# Patient Record
Sex: FEMALE | Race: WHITE | NOT HISPANIC OR LATINO | Employment: OTHER | URBAN - METROPOLITAN AREA
[De-identification: names, ages, dates, MRNs, and addresses within clinical notes are randomized per-mention and may not be internally consistent; named-entity substitution may affect disease eponyms.]

---

## 2022-01-28 ENCOUNTER — OFFICE VISIT (OUTPATIENT)
Dept: ENDOCRINOLOGY | Facility: CLINIC | Age: 77
End: 2022-01-28

## 2022-01-28 VITALS
OXYGEN SATURATION: 94 % | HEART RATE: 83 BPM | HEIGHT: 65 IN | DIASTOLIC BLOOD PRESSURE: 100 MMHG | SYSTOLIC BLOOD PRESSURE: 164 MMHG | BODY MASS INDEX: 25.83 KG/M2 | WEIGHT: 155 LBS

## 2022-01-28 DIAGNOSIS — E04.2 NONTOXIC MULTINODULAR GOITER: Primary | ICD-10-CM

## 2022-01-28 PROCEDURE — 76536 US EXAM OF HEAD AND NECK: CPT | Performed by: INTERNAL MEDICINE

## 2022-01-28 PROCEDURE — 99204 OFFICE O/P NEW MOD 45 MIN: CPT | Performed by: INTERNAL MEDICINE

## 2022-01-28 RX ORDER — NORTRIPTYLINE HYDROCHLORIDE 25 MG/1
25 CAPSULE ORAL NIGHTLY
COMMUNITY
Start: 2021-10-27

## 2022-01-28 RX ORDER — OMEPRAZOLE 20 MG/1
20 CAPSULE, DELAYED RELEASE ORAL DAILY PRN
COMMUNITY

## 2022-01-28 RX ORDER — ASPIRIN 81 MG/1
81 TABLET ORAL DAILY
COMMUNITY

## 2022-01-28 RX ORDER — OFLOXACIN 3 MG/ML
SOLUTION/ DROPS OPHTHALMIC DAILY
COMMUNITY
End: 2022-05-18

## 2022-01-28 RX ORDER — METRONIDAZOLE 7.5 MG/G
1 GEL TOPICAL NIGHTLY
COMMUNITY

## 2022-01-28 RX ORDER — ROSUVASTATIN CALCIUM 20 MG/1
20 TABLET, COATED ORAL DAILY
COMMUNITY

## 2022-01-28 RX ORDER — METOPROLOL SUCCINATE 50 MG/1
50 TABLET, EXTENDED RELEASE ORAL DAILY
COMMUNITY
Start: 2021-10-31

## 2022-01-28 NOTE — ASSESSMENT & PLAN NOTE
To check on her goiter, I did a real time, in office ultrasound today    Results- right lobe is normal in size and texture  Left lobe has a 1.7 cm , irregular nodule with internal calcifications.    Rec: based upon those findings, I will have her back in for an FNA

## 2022-01-28 NOTE — PROGRESS NOTES
"     Office Note      Date: 2022  Patient Name: Crystal Mccloud  MRN: [id number]  : 1945    Chief Complaint   Patient presents with   • Thyroid Problem       History of Present Illness:   Crystal Mccloud is a 76 y.o. female who presents for Thyroid Problem  she is seen as a new patient today  ------------------------------------------  She states she saw me years ago at my other office.I reviewed the old data base and have no record of that which means it was more than 5 years ago.  She has had thyroid nodules for a number of years. They don't bother her except that sometimes she cannot swallow her saliva but she has not trouble swallowing food.  She has not had abnormal thyroid levels and has never taken thyroid medication.    She has not had to have a biopsy      Subjective      [place] was [place]  Facial radiation exposure: No.  High iodine intake: No  Family hx of thyroid disease: No.    Review of Systems:   Review of Systems   Constitutional: Positive for fatigue. Negative for unexpected weight change.   HENT: Positive for trouble swallowing.    Psychiatric/Behavioral: Positive for sleep disturbance.       The following portions of the patient's history were reviewed and updated as appropriate: allergies, current medications, past family history, past medical history, past social history, past surgical history and problem list.    Objective     Visit Vitals  /100   Pulse 83   Ht 165.1 cm (65\")   Wt 70.3 kg (155 lb)   SpO2 94%   BMI 25.79 kg/m²       Labs:    CBC w/DIFF  No results found for: WBC, RBC, HGB, HCT, MCV, MCH, MCHC, RDW, RDWSD, MPV, PLT, NEUTRORELPCT, LYMPHORELPCT, MONORELPCT, EOSRELPCT, BASORELPCT, AUTOIGPER, NEUTROABS, LYMPHSABS, MONOSABS, EOSABS, BASOSABS, AUTOIGNUM, NRBC    T4  No results found for: FREET4    TSH  No results found for: TSHBASE     Physical Exam:  Physical Exam  Vitals reviewed.   Constitutional:       Appearance: Normal appearance.   Eyes:      " Extraocular Movements: Extraocular movements intact.   Neck:      Comments: Right side is larger than left . Soft irregular . No hard nodules   Lymphadenopathy:      Cervical: No cervical adenopathy.   Neurological:      Mental Status: She is alert.   Psychiatric:         Mood and Affect: Mood normal.         Thought Content: Thought content normal.         Judgment: Judgment normal.         Assessment / Plan      Assessment & Plan:  Problem List Items Addressed This Visit        Endocrine and Metabolic    Nontoxic multinodular goiter - Primary    Current Assessment & Plan     To check on her goiter, I did a real time, in office ultrasound today    Results- right lobe is normal in size and texture  Left lobe has a 1.7 cm , irregular nodule with internal calcifications.    Rec: based upon those findings, I will have her back in for an FNA          Relevant Medications    metoprolol succinate XL (TOPROL-XL) 50 MG 24 hr tablet    Other Relevant Orders    US Thyroid (Completed)           Cruz Zhang MD   01/28/2022

## 2022-02-11 ENCOUNTER — OFFICE VISIT (OUTPATIENT)
Dept: ENDOCRINOLOGY | Facility: CLINIC | Age: 77
End: 2022-02-11

## 2022-02-11 VITALS
BODY MASS INDEX: 25.66 KG/M2 | DIASTOLIC BLOOD PRESSURE: 88 MMHG | HEART RATE: 81 BPM | HEIGHT: 65 IN | OXYGEN SATURATION: 95 % | WEIGHT: 154 LBS | SYSTOLIC BLOOD PRESSURE: 178 MMHG

## 2022-02-11 DIAGNOSIS — E04.2 NONTOXIC MULTINODULAR GOITER: Primary | ICD-10-CM

## 2022-02-11 PROCEDURE — 10005 FNA BX W/US GDN 1ST LES: CPT | Performed by: INTERNAL MEDICINE

## 2022-02-11 PROCEDURE — 99212 OFFICE O/P EST SF 10 MIN: CPT | Performed by: INTERNAL MEDICINE

## 2022-02-11 NOTE — PROGRESS NOTES
"     Office Note      Date: 2022  Patient Name: Crystal Mccloud  MRN: [id number]  : 1945    Chief Complaint   Patient presents with   • Goiter       History of Present Illness:   Crystal Mccloud is a 76 y.o. female who presents for Goiter  she is here for me to do an ultrasound guided fna of left thyroid nodule    After consent, using the usual sterile technique and ultrasound guided fna of left thyroid nodule was accomplished without complications. Results to follow     Subjective          Review of Systems:   Review of Systems   Constitutional: Negative.    HENT: Negative.    Eyes: Negative.    Respiratory: Negative.        The following portions of the patient's history were reviewed and updated as appropriate: allergies, current medications, past family history, past medical history, past social history, past surgical history and problem list.    Objective     Visit Vitals  /88 (BP Location: Left arm, Patient Position: Sitting, Cuff Size: Adult)   Pulse 81   Ht 165.1 cm (65\")   Wt 69.9 kg (154 lb)   SpO2 95%   BMI 25.63 kg/m²       Labs:    CBC w/DIFF  No results found for: WBC, RBC, HGB, HCT, MCV, MCH, MCHC, RDW, RDWSD, MPV, PLT, NEUTRORELPCT, LYMPHORELPCT, MONORELPCT, EOSRELPCT, BASORELPCT, AUTOIGPER, NEUTROABS, LYMPHSABS, MONOSABS, EOSABS, BASOSABS, AUTOIGNUM, NRBC    T4  No results found for: FREET4    TSH  No results found for: TSHBASE     Physical Exam:  Physical Exam  Vitals reviewed.   Constitutional:       Appearance: Normal appearance.   Neurological:      Mental Status: She is alert.         Assessment / Plan      Assessment & Plan:  Problem List Items Addressed This Visit        Other    Nontoxic multinodular goiter - Primary    Current Assessment & Plan     fna done without complication         Relevant Medications    metoprolol succinate XL (TOPROL-XL) 50 MG 24 hr tablet    Other Relevant Orders    US Thyroid (Completed)           Cruz Zhang MD   2022  "

## 2022-02-15 DIAGNOSIS — E04.2 NONTOXIC MULTINODULAR GOITER: Primary | ICD-10-CM

## 2022-05-18 ENCOUNTER — PRE-ADMISSION TESTING (OUTPATIENT)
Dept: PREADMISSION TESTING | Facility: HOSPITAL | Age: 77
End: 2022-05-18

## 2022-05-18 VITALS — WEIGHT: 155.65 LBS | BODY MASS INDEX: 25.93 KG/M2 | HEIGHT: 65 IN

## 2022-05-18 LAB
ANION GAP SERPL CALCULATED.3IONS-SCNC: 10 MMOL/L (ref 5–15)
BUN SERPL-MCNC: 16 MG/DL (ref 8–23)
BUN/CREAT SERPL: 17.8 (ref 7–25)
CALCIUM SPEC-SCNC: 9.8 MG/DL (ref 8.6–10.5)
CHLORIDE SERPL-SCNC: 104 MMOL/L (ref 98–107)
CO2 SERPL-SCNC: 29 MMOL/L (ref 22–29)
CREAT SERPL-MCNC: 0.9 MG/DL (ref 0.57–1)
DEPRECATED RDW RBC AUTO: 38.5 FL (ref 37–54)
EGFRCR SERPLBLD CKD-EPI 2021: 66.4 ML/MIN/1.73
ERYTHROCYTE [DISTWIDTH] IN BLOOD BY AUTOMATED COUNT: 12 % (ref 12.3–15.4)
GLUCOSE SERPL-MCNC: 118 MG/DL (ref 65–99)
HCT VFR BLD AUTO: 42.5 % (ref 34–46.6)
HGB BLD-MCNC: 13.7 G/DL (ref 12–15.9)
MCH RBC QN AUTO: 28.2 PG (ref 26.6–33)
MCHC RBC AUTO-ENTMCNC: 32.2 G/DL (ref 31.5–35.7)
MCV RBC AUTO: 87.4 FL (ref 79–97)
PLATELET # BLD AUTO: 226 10*3/MM3 (ref 140–450)
PMV BLD AUTO: 9.3 FL (ref 6–12)
POTASSIUM SERPL-SCNC: 4.1 MMOL/L (ref 3.5–5.2)
QT INTERVAL: 414 MS
QTC INTERVAL: 447 MS
RBC # BLD AUTO: 4.86 10*6/MM3 (ref 3.77–5.28)
SARS-COV-2 RNA PNL SPEC NAA+PROBE: NOT DETECTED
SODIUM SERPL-SCNC: 143 MMOL/L (ref 136–145)
WBC NRBC COR # BLD: 6.49 10*3/MM3 (ref 3.4–10.8)

## 2022-05-18 PROCEDURE — 93005 ELECTROCARDIOGRAM TRACING: CPT

## 2022-05-18 PROCEDURE — U0005 INFEC AGEN DETEC AMPLI PROBE: HCPCS

## 2022-05-18 PROCEDURE — 80048 BASIC METABOLIC PNL TOTAL CA: CPT

## 2022-05-18 PROCEDURE — 36415 COLL VENOUS BLD VENIPUNCTURE: CPT

## 2022-05-18 PROCEDURE — 85027 COMPLETE CBC AUTOMATED: CPT

## 2022-05-18 PROCEDURE — C9803 HOPD COVID-19 SPEC COLLECT: HCPCS

## 2022-05-18 PROCEDURE — 93010 ELECTROCARDIOGRAM REPORT: CPT | Performed by: INTERNAL MEDICINE

## 2022-05-18 PROCEDURE — U0004 COV-19 TEST NON-CDC HGH THRU: HCPCS

## 2022-05-18 RX ORDER — PREDNISOLONE ACETATE 10 MG/ML
1 SUSPENSION/ DROPS OPHTHALMIC DAILY
COMMUNITY

## 2022-05-18 RX ORDER — AMLODIPINE BESYLATE 5 MG/1
5 TABLET ORAL DAILY
COMMUNITY

## 2022-05-18 NOTE — PAT
Patient to apply Chlorhexadine wipes  to surgical area (as instructed) the night before procedure and the AM of procedure. Wipes provided.    Patient viewed general PAT education video as instructed in their preoperative information received from their surgeon.  Patient stated the general PAT education video was viewed in its entirety and survey completed.  Copies of PAT general education handouts (Incentive Spirometry, Meds to Beds Program, Patient Belongings, Pre-op skin preparation instructions, Blood Glucose testing, Visitor policy, Surgery FAQ, Code H) distributed to patient if not printed. Education related to the PAT pass and skin preparation for surgery (if applicable) completed in PAT as a reinforcement to PAT education video. Patient instructed to return PAT pass provided today as well as completed skin preparation sheet (if applicable) on the day of procedure.     Additionally if patient had not viewed video yet but intended to view it at home or in our waiting area, then referred them to the handout with QR code/link provided during PAT visit.  Instructed patient to complete survey after viewing the video in its entirety.  Encouraged patient/family to read PAT general education handouts thoroughly and notify PAT staff with any questions or concerns. Patient verbalized understanding of all information and priority content.    Cardiac clearance per dr ro on chart

## 2022-05-19 ENCOUNTER — ANESTHESIA EVENT (OUTPATIENT)
Dept: PERIOP | Facility: HOSPITAL | Age: 77
End: 2022-05-19

## 2022-05-19 RX ORDER — FAMOTIDINE 10 MG/ML
20 INJECTION, SOLUTION INTRAVENOUS ONCE
Status: CANCELLED | OUTPATIENT
Start: 2022-05-19 | End: 2022-05-19

## 2022-05-19 RX ORDER — SODIUM CHLORIDE 0.9 % (FLUSH) 0.9 %
10 SYRINGE (ML) INJECTION EVERY 12 HOURS SCHEDULED
Status: CANCELLED | OUTPATIENT
Start: 2022-05-19

## 2022-05-19 RX ORDER — SODIUM CHLORIDE 0.9 % (FLUSH) 0.9 %
10 SYRINGE (ML) INJECTION AS NEEDED
Status: CANCELLED | OUTPATIENT
Start: 2022-05-19

## 2022-05-20 ENCOUNTER — HOSPITAL ENCOUNTER (OUTPATIENT)
Facility: HOSPITAL | Age: 77
Discharge: HOME | End: 2022-05-21
Attending: SURGERY | Admitting: SURGERY

## 2022-05-20 ENCOUNTER — ANESTHESIA (OUTPATIENT)
Dept: PERIOP | Facility: HOSPITAL | Age: 77
End: 2022-05-20

## 2022-05-20 DIAGNOSIS — E04.1 THYROID NODULE: ICD-10-CM

## 2022-05-20 PROCEDURE — 25010000002 PROPOFOL 10 MG/ML EMULSION: Performed by: NURSE ANESTHETIST, CERTIFIED REGISTERED

## 2022-05-20 PROCEDURE — 63710000001 NORTRIPTYLINE 25 MG CAPSULE: Performed by: SURGERY

## 2022-05-20 PROCEDURE — G0378 HOSPITAL OBSERVATION PER HR: HCPCS

## 2022-05-20 PROCEDURE — 25010000002 DEXAMETHASONE PER 1 MG: Performed by: NURSE ANESTHETIST, CERTIFIED REGISTERED

## 2022-05-20 PROCEDURE — 60220 PARTIAL REMOVAL OF THYROID: CPT

## 2022-05-20 PROCEDURE — 25010000002 ONDANSETRON PER 1 MG

## 2022-05-20 PROCEDURE — 25010000002 HYDROMORPHONE 1 MG/ML SOLUTION

## 2022-05-20 PROCEDURE — 25010000002 FENTANYL CITRATE (PF) 50 MCG/ML SOLUTION

## 2022-05-20 PROCEDURE — 88307 TISSUE EXAM BY PATHOLOGIST: CPT | Performed by: SURGERY

## 2022-05-20 PROCEDURE — 25010000002 SUCCINYLCHOLINE PER 20 MG: Performed by: NURSE ANESTHETIST, CERTIFIED REGISTERED

## 2022-05-20 PROCEDURE — 63710000001 FAMOTIDINE 20 MG TABLET: Performed by: SURGERY

## 2022-05-20 PROCEDURE — 25010000002 FENTANYL CITRATE (PF) 50 MCG/ML SOLUTION: Performed by: NURSE ANESTHETIST, CERTIFIED REGISTERED

## 2022-05-20 PROCEDURE — A9270 NON-COVERED ITEM OR SERVICE: HCPCS | Performed by: SURGERY

## 2022-05-20 PROCEDURE — 63710000001 OXYCODONE-ACETAMINOPHEN 5-325 MG TABLET: Performed by: SURGERY

## 2022-05-20 PROCEDURE — 25010000002 ONDANSETRON PER 1 MG: Performed by: NURSE ANESTHETIST, CERTIFIED REGISTERED

## 2022-05-20 PROCEDURE — 0 LIDOCAINE 1 % SOLUTION: Performed by: NURSE ANESTHETIST, CERTIFIED REGISTERED

## 2022-05-20 DEVICE — ABSORBABLE HEMOSTAT (OXIDIZED REGENERATED CELLULOSE, U.S.P.)
Type: IMPLANTABLE DEVICE | Site: THYROID | Status: FUNCTIONAL
Brand: SURGICEL

## 2022-05-20 DEVICE — HORIZON TI MED 6/CART
Type: IMPLANTABLE DEVICE | Site: THYROID | Status: FUNCTIONAL
Brand: WECK

## 2022-05-20 DEVICE — HORIZON TI SMALL 6 CLIPS/CART
Type: IMPLANTABLE DEVICE | Site: THYROID | Status: FUNCTIONAL
Brand: WECK

## 2022-05-20 RX ORDER — FAMOTIDINE 20 MG/1
20 TABLET, FILM COATED ORAL ONCE
Status: COMPLETED | OUTPATIENT
Start: 2022-05-20 | End: 2022-05-20

## 2022-05-20 RX ORDER — ONDANSETRON 2 MG/ML
INJECTION INTRAMUSCULAR; INTRAVENOUS
Status: COMPLETED
Start: 2022-05-20 | End: 2022-05-20

## 2022-05-20 RX ORDER — SODIUM CHLORIDE, SODIUM LACTATE, POTASSIUM CHLORIDE, CALCIUM CHLORIDE 600; 310; 30; 20 MG/100ML; MG/100ML; MG/100ML; MG/100ML
9 INJECTION, SOLUTION INTRAVENOUS CONTINUOUS
Status: DISCONTINUED | OUTPATIENT
Start: 2022-05-20 | End: 2022-05-20

## 2022-05-20 RX ORDER — NORTRIPTYLINE HYDROCHLORIDE 25 MG/1
25 CAPSULE ORAL NIGHTLY
Status: DISCONTINUED | OUTPATIENT
Start: 2022-05-20 | End: 2022-05-21 | Stop reason: HOSPADM

## 2022-05-20 RX ORDER — NALOXONE HCL 0.4 MG/ML
0.1 VIAL (ML) INJECTION
Status: DISCONTINUED | OUTPATIENT
Start: 2022-05-20 | End: 2022-05-21 | Stop reason: HOSPADM

## 2022-05-20 RX ORDER — ONDANSETRON 4 MG/1
4 TABLET, FILM COATED ORAL EVERY 6 HOURS PRN
Status: DISCONTINUED | OUTPATIENT
Start: 2022-05-20 | End: 2022-05-21 | Stop reason: HOSPADM

## 2022-05-20 RX ORDER — AMLODIPINE BESYLATE 5 MG/1
5 TABLET ORAL DAILY
Status: DISCONTINUED | OUTPATIENT
Start: 2022-05-21 | End: 2022-05-21 | Stop reason: HOSPADM

## 2022-05-20 RX ORDER — DOCUSATE SODIUM 100 MG/1
100 CAPSULE, LIQUID FILLED ORAL 2 TIMES DAILY PRN
Status: DISCONTINUED | OUTPATIENT
Start: 2022-05-20 | End: 2022-05-21 | Stop reason: HOSPADM

## 2022-05-20 RX ORDER — ONDANSETRON 2 MG/ML
4 INJECTION INTRAMUSCULAR; INTRAVENOUS EVERY 6 HOURS PRN
Status: DISCONTINUED | OUTPATIENT
Start: 2022-05-20 | End: 2022-05-21 | Stop reason: HOSPADM

## 2022-05-20 RX ORDER — CEFAZOLIN SODIUM IN 0.9 % NACL 2 G/100 ML
2 PLASTIC BAG, INJECTION (ML) INTRAVENOUS ONCE
Status: COMPLETED | OUTPATIENT
Start: 2022-05-20 | End: 2022-05-20

## 2022-05-20 RX ORDER — LIDOCAINE HYDROCHLORIDE 10 MG/ML
INJECTION, SOLUTION INFILTRATION; PERINEURAL AS NEEDED
Status: DISCONTINUED | OUTPATIENT
Start: 2022-05-20 | End: 2022-05-20 | Stop reason: SURG

## 2022-05-20 RX ORDER — DEXTROSE AND SODIUM CHLORIDE 5; .45 G/100ML; G/100ML
75 INJECTION, SOLUTION INTRAVENOUS CONTINUOUS
Status: DISCONTINUED | OUTPATIENT
Start: 2022-05-20 | End: 2022-05-20

## 2022-05-20 RX ORDER — PROPOFOL 10 MG/ML
VIAL (ML) INTRAVENOUS AS NEEDED
Status: DISCONTINUED | OUTPATIENT
Start: 2022-05-20 | End: 2022-05-20 | Stop reason: SURG

## 2022-05-20 RX ORDER — ROCURONIUM BROMIDE 10 MG/ML
INJECTION, SOLUTION INTRAVENOUS AS NEEDED
Status: DISCONTINUED | OUTPATIENT
Start: 2022-05-20 | End: 2022-05-20 | Stop reason: SURG

## 2022-05-20 RX ORDER — OXYCODONE HYDROCHLORIDE AND ACETAMINOPHEN 5; 325 MG/1; MG/1
1 TABLET ORAL EVERY 4 HOURS PRN
Status: DISCONTINUED | OUTPATIENT
Start: 2022-05-20 | End: 2022-05-21 | Stop reason: HOSPADM

## 2022-05-20 RX ORDER — DEXAMETHASONE SODIUM PHOSPHATE 4 MG/ML
INJECTION, SOLUTION INTRA-ARTICULAR; INTRALESIONAL; INTRAMUSCULAR; INTRAVENOUS; SOFT TISSUE AS NEEDED
Status: DISCONTINUED | OUTPATIENT
Start: 2022-05-20 | End: 2022-05-20 | Stop reason: SURG

## 2022-05-20 RX ORDER — FAMOTIDINE 20 MG/1
20 TABLET, FILM COATED ORAL 2 TIMES DAILY
Status: DISCONTINUED | OUTPATIENT
Start: 2022-05-20 | End: 2022-05-21 | Stop reason: HOSPADM

## 2022-05-20 RX ORDER — HYDROMORPHONE HYDROCHLORIDE 1 MG/ML
0.5 INJECTION, SOLUTION INTRAMUSCULAR; INTRAVENOUS; SUBCUTANEOUS
Status: DISCONTINUED | OUTPATIENT
Start: 2022-05-20 | End: 2022-05-20 | Stop reason: HOSPADM

## 2022-05-20 RX ORDER — MIDAZOLAM HYDROCHLORIDE 1 MG/ML
0.5 INJECTION INTRAMUSCULAR; INTRAVENOUS
Status: DISCONTINUED | OUTPATIENT
Start: 2022-05-20 | End: 2022-05-20 | Stop reason: HOSPADM

## 2022-05-20 RX ORDER — ONDANSETRON 2 MG/ML
4 INJECTION INTRAMUSCULAR; INTRAVENOUS ONCE AS NEEDED
Status: COMPLETED | OUTPATIENT
Start: 2022-05-20 | End: 2022-05-20

## 2022-05-20 RX ORDER — ASPIRIN 81 MG/1
81 TABLET ORAL DAILY
Status: DISCONTINUED | OUTPATIENT
Start: 2022-05-21 | End: 2022-05-21 | Stop reason: HOSPADM

## 2022-05-20 RX ORDER — SUCCINYLCHOLINE CHLORIDE 20 MG/ML
INJECTION INTRAMUSCULAR; INTRAVENOUS AS NEEDED
Status: DISCONTINUED | OUTPATIENT
Start: 2022-05-20 | End: 2022-05-20 | Stop reason: SURG

## 2022-05-20 RX ORDER — MAGNESIUM HYDROXIDE 1200 MG/15ML
LIQUID ORAL AS NEEDED
Status: DISCONTINUED | OUTPATIENT
Start: 2022-05-20 | End: 2022-05-20 | Stop reason: HOSPADM

## 2022-05-20 RX ORDER — FENTANYL CITRATE 50 UG/ML
INJECTION, SOLUTION INTRAMUSCULAR; INTRAVENOUS
Status: COMPLETED
Start: 2022-05-20 | End: 2022-05-20

## 2022-05-20 RX ORDER — LIDOCAINE HYDROCHLORIDE 10 MG/ML
0.5 INJECTION, SOLUTION EPIDURAL; INFILTRATION; INTRACAUDAL; PERINEURAL ONCE AS NEEDED
Status: COMPLETED | OUTPATIENT
Start: 2022-05-20 | End: 2022-05-20

## 2022-05-20 RX ORDER — LIDOCAINE HYDROCHLORIDE 20 MG/ML
JELLY TOPICAL AS NEEDED
Status: DISCONTINUED | OUTPATIENT
Start: 2022-05-20 | End: 2022-05-20 | Stop reason: SURG

## 2022-05-20 RX ORDER — ONDANSETRON 2 MG/ML
INJECTION INTRAMUSCULAR; INTRAVENOUS AS NEEDED
Status: DISCONTINUED | OUTPATIENT
Start: 2022-05-20 | End: 2022-05-20 | Stop reason: SURG

## 2022-05-20 RX ORDER — FENTANYL CITRATE 50 UG/ML
INJECTION, SOLUTION INTRAMUSCULAR; INTRAVENOUS AS NEEDED
Status: DISCONTINUED | OUTPATIENT
Start: 2022-05-20 | End: 2022-05-20 | Stop reason: SURG

## 2022-05-20 RX ORDER — FENTANYL CITRATE 50 UG/ML
50 INJECTION, SOLUTION INTRAMUSCULAR; INTRAVENOUS
Status: DISCONTINUED | OUTPATIENT
Start: 2022-05-20 | End: 2022-05-20 | Stop reason: HOSPADM

## 2022-05-20 RX ORDER — METOPROLOL SUCCINATE 50 MG/1
50 TABLET, EXTENDED RELEASE ORAL DAILY
Status: DISCONTINUED | OUTPATIENT
Start: 2022-05-21 | End: 2022-05-21 | Stop reason: HOSPADM

## 2022-05-20 RX ORDER — LEVOTHYROXINE SODIUM 0.05 MG/1
50 TABLET ORAL
Status: DISCONTINUED | OUTPATIENT
Start: 2022-05-21 | End: 2022-05-21 | Stop reason: HOSPADM

## 2022-05-20 RX ADMIN — FENTANYL CITRATE 100 MCG: 50 INJECTION, SOLUTION INTRAMUSCULAR; INTRAVENOUS at 09:40

## 2022-05-20 RX ADMIN — PROPOFOL 35 MCG/KG/MIN: 10 INJECTION, EMULSION INTRAVENOUS at 09:44

## 2022-05-20 RX ADMIN — SUCCINYLCHOLINE CHLORIDE 125 MG: 20 INJECTION, SOLUTION INTRAMUSCULAR; INTRAVENOUS at 09:40

## 2022-05-20 RX ADMIN — LIDOCAINE HYDROCHLORIDE 50 MG: 10 INJECTION, SOLUTION INFILTRATION; PERINEURAL at 09:40

## 2022-05-20 RX ADMIN — HYDROMORPHONE HYDROCHLORIDE 0.5 MG: 1 INJECTION, SOLUTION INTRAMUSCULAR; INTRAVENOUS; SUBCUTANEOUS at 12:18

## 2022-05-20 RX ADMIN — PROPOFOL 150 MG: 10 INJECTION, EMULSION INTRAVENOUS at 09:40

## 2022-05-20 RX ADMIN — LIDOCAINE HYDROCHLORIDE 0.5 ML: 10 INJECTION, SOLUTION EPIDURAL; INFILTRATION; INTRACAUDAL; PERINEURAL at 07:55

## 2022-05-20 RX ADMIN — SODIUM CHLORIDE, POTASSIUM CHLORIDE, SODIUM LACTATE AND CALCIUM CHLORIDE 9 ML/HR: 600; 310; 30; 20 INJECTION, SOLUTION INTRAVENOUS at 07:55

## 2022-05-20 RX ADMIN — OXYCODONE HYDROCHLORIDE AND ACETAMINOPHEN 1 TABLET: 5; 325 TABLET ORAL at 16:25

## 2022-05-20 RX ADMIN — DEXAMETHASONE SODIUM PHOSPHATE 8 MG: 4 INJECTION, SOLUTION INTRA-ARTICULAR; INTRALESIONAL; INTRAMUSCULAR; INTRAVENOUS; SOFT TISSUE at 09:44

## 2022-05-20 RX ADMIN — NORTRIPTYLINE HYDROCHLORIDE 25 MG: 25 CAPSULE ORAL at 20:54

## 2022-05-20 RX ADMIN — FAMOTIDINE 20 MG: 20 TABLET ORAL at 08:13

## 2022-05-20 RX ADMIN — ONDANSETRON 4 MG: 2 INJECTION INTRAMUSCULAR; INTRAVENOUS at 11:10

## 2022-05-20 RX ADMIN — HYDROMORPHONE HYDROCHLORIDE 0.5 MG: 1 INJECTION, SOLUTION INTRAMUSCULAR; INTRAVENOUS; SUBCUTANEOUS at 11:10

## 2022-05-20 RX ADMIN — ONDANSETRON 4 MG: 2 INJECTION INTRAMUSCULAR; INTRAVENOUS at 10:33

## 2022-05-20 RX ADMIN — ROCURONIUM BROMIDE 5 MG: 10 INJECTION, SOLUTION INTRAVENOUS at 09:40

## 2022-05-20 RX ADMIN — FAMOTIDINE 20 MG: 20 TABLET ORAL at 20:54

## 2022-05-20 RX ADMIN — LIDOCAINE HYDROCHLORIDE 3 ML: 20 JELLY TOPICAL at 09:41

## 2022-05-20 RX ADMIN — OXYCODONE HYDROCHLORIDE AND ACETAMINOPHEN 1 TABLET: 5; 325 TABLET ORAL at 20:59

## 2022-05-20 RX ADMIN — FENTANYL CITRATE 50 MCG: 50 INJECTION, SOLUTION INTRAMUSCULAR; INTRAVENOUS at 11:20

## 2022-05-20 RX ADMIN — CEFAZOLIN 2 G: 10 INJECTION, POWDER, FOR SOLUTION INTRAVENOUS at 09:34

## 2022-05-20 RX ADMIN — FENTANYL CITRATE 50 MCG: 50 INJECTION, SOLUTION INTRAMUSCULAR; INTRAVENOUS at 11:30

## 2022-05-20 RX ADMIN — SODIUM CHLORIDE, POTASSIUM CHLORIDE, SODIUM LACTATE AND CALCIUM CHLORIDE 9 ML/HR: 600; 310; 30; 20 INJECTION, SOLUTION INTRAVENOUS at 11:02

## 2022-05-20 NOTE — ANESTHESIA PREPROCEDURE EVALUATION
Anesthesia Evaluation     Patient summary reviewed and Nursing notes reviewed   no history of anesthetic complications:  NPO Solid Status: > 8 hours  NPO Liquid Status: > 2 hours           Airway   Mallampati: II  TM distance: >3 FB  Neck ROM: full  No difficulty expected  Dental - normal exam     Pulmonary - negative pulmonary ROS and normal exam    breath sounds clear to auscultation  Cardiovascular - normal exam    ECG reviewed  Rhythm: regular  Rate: normal    (+) hypertension, valvular problems/murmurs (s/p AVR 2013 - no issues since replacement),       Neuro/Psych  (+) TIA,    GI/Hepatic/Renal/Endo    (+)  GERD (Occasional),  thyroid problem (Goiter - no dysphagia or positional dyspnea)     Musculoskeletal (-) negative ROS    Abdominal    Substance History      OB/GYN          Other - negative ROS                       Anesthesia Plan    ASA 2     general     intravenous induction     Anesthetic plan, all risks, benefits, and alternatives have been provided, discussed and informed consent has been obtained with: patient.    Plan discussed with CRNA.        CODE STATUS:

## 2022-05-20 NOTE — PROGRESS NOTES
"Patient Name:  Crystal Mccloud  YOB: 1945  [id number]    Surgery Progress Note    Date of visit: 5/20/2022    Subjective   Pain well controlled. No nausea/vomiting. No fevers/chills.          Objective       /77 (BP Location: Right arm, Patient Position: Lying)   Pulse 74   Temp 96.8 °F (36 °C) (Axillary)   Resp 16   Ht 165.1 cm (65\")   Wt 70.3 kg (155 lb)   SpO2 90%   BMI 25.79 kg/m²     Intake/Output Summary (Last 24 hours) at 5/20/2022 1429  Last data filed at 5/20/2022 1050  Gross per 24 hour   Intake 800 ml   Output --   Net 800 ml       CV:  Rhythm regular and rate regular  L:  Clear to auscultation bilaterally  Abd:  Bowel sounds positive, soft  Ext:  No cyanosis, clubbing, edema  HEENT: neck flat, dressing c/d/i           Assessment & Plan     Pt POD#0 left thyroid lobectomy  Pain control  OOB, IS, DVT prlx  Advance diet as tolerated, HLIV  Start Synthroid in AM        Je Gutierrez MD  5/20/2022  14:29 EDT      "

## 2022-05-20 NOTE — CASE MANAGEMENT/SOCIAL WORK
Discharge Planning Assessment  [facility]     Patient Name: Crystal Mccloud  MRN: [id number]  Today's Date: 5/20/2022    Admit Date: 5/20/2022     Discharge Needs Assessment     Row Name 05/20/22 1404       Living Environment    People in Home alone    Current Living Arrangements home    Primary Care Provided by self    Provides Primary Care For no one    Family Caregiver if Needed child(anand), adult    Able to Return to Prior Arrangements yes       Resource/Environmental Concerns    Resource/Environmental Concerns none       Transition Planning    Patient/Family Anticipates Transition to home    Patient/Family Anticipated Services at Transition none    Transportation Anticipated family or friend will provide       Discharge Needs Assessment    Readmission Within the Last 30 Days no previous admission in last 30 days    Equipment Currently Used at Home shower chair    Concerns to be Addressed no discharge needs identified;denies needs/concerns at this time    Anticipated Changes Related to Illness none    Equipment Needed After Discharge none               Discharge Plan     Row Name 05/20/22 1409       Plan    Plan Home    Patient/Family in Agreement with Plan yes    Plan Comments Met & spoke with Ms Mccloud at the bedside. She lives alone in [facility]. At baseline, is ind with ADL's/mobility. Denies HH/Rehab/O2. Has a shower chair. Has a living will, but it doesn't indicate a HCS. No POA. No needs voiced. Plan is home and her family will transport.    Final Discharge Disposition Code 01 - home or self-care              Continued Care and Services - Admitted Since 5/20/2022    Coordination has not been started for this encounter.          Demographic Summary     Row Name 05/20/22 1408       General Information    General Information Comments Primary insurance is Medicare. Secondary is [employer] [place]. Has drug coverage.               Functional Status     Row Name 05/20/22 8916       Functional Status     Usual Activity Tolerance good    Current Activity Tolerance good       Functional Status, IADL    Medications independent    Meal Preparation independent    Housekeeping independent    Laundry independent    Shopping independent               Psychosocial    No documentation.                Abuse/Neglect    No documentation.                Legal    No documentation.                Substance Abuse    No documentation.                Patient Forms    No documentation.                   Ann Maradiaga RN

## 2022-05-20 NOTE — H&P
Pre-Op H&P  Crystal Mccloud  [id number]  1945      Chief complaint: Thyroid nodule      Subjective:  Patient is a 76 y.o.female presents for scheduled surgery by Dr. Gutierrez. She anticipates a LEFT THYROID LOBECTOMY today.  She had FNA left thyroid nodule by endocrinology on 2/11/2022 showing atypical results.  She reports some difficulty swallowing, occasional choking and regurgitation.  She denies changes in weight or activity level.      Review of Systems:  Constitutional-- No fever, chills or sweats. No fatigue.  CV-- No chest pain, palpitation or syncope. +HTN, HLD, CAD  Resp-- No SOB, cough, hemoptysis. +BI   Skin--No rashes or lesions      Allergies: No Known Allergies      Home Meds:  Medications Prior to Admission   Medication Sig Dispense Refill Last Dose   • amLODIPine (NORVASC) 5 MG tablet Take 5 mg by mouth Daily.   5/20/2022 at 0530   • aspirin 81 MG EC tablet Take 81 mg by mouth Daily.   5/19/2022 at 0830   • hydrocortisone 2.5 % cream Apply 1 application topically to the appropriate area as directed Daily. Daily to face for roscea   5/19/2022 at 2000   • metoprolol succinate XL (TOPROL-XL) 50 MG 24 hr tablet Take 50 mg by mouth Daily.   5/20/2022 at 0530   • nortriptyline (PAMELOR) 25 MG capsule Take 25 mg by mouth Every Night.   5/19/2022 at 2100   • omeprazole (priLOSEC) 20 MG capsule Take 20 mg by mouth Daily As Needed (reflux, heartburn).   5/19/2022 at 2200   • prednisoLONE acetate (PRED FORTE) 1 % ophthalmic suspension Administer 1 drop to both eyes Daily.   5/19/2022 at 2000   • rosuvastatin (CRESTOR) 20 MG tablet Take 20 mg by mouth Daily.   5/19/2022 at 0830   • metroNIDAZOLE (METROGEL) 0.75 % gel Apply 1 application topically to the appropriate area as directed Every Night. Apply to nose   5/18/2022 at 2000         PMH:   Past Medical History:   Diagnosis Date   • Anesthesia     slow to be extubated with heart surgery, no problems with cholecystectomy (most recent surgery)   •  "Coronary artery disease    • GERD (gastroesophageal reflux disease)    • Goiter    • Hyperlipidemia    • Hypertension    • Sleep apnea    • Stroke (HCC)     TIA, multiple. Pt states due to use of Eliquis   • Thyroid condition     Thyroid Nodule     PSH:    Past Surgical History:   Procedure Laterality Date   • CARDIAC CATHETERIZATION     • CATARACT EXTRACTION Bilateral    • COLONOSCOPY     • EYE SURGERY Bilateral     cornea transplant   • GALLBLADDER SURGERY     • HYSTERECTOMY     • MASS EXCISION Right     breast   • TISSUE AORTIC VALVE REPLACEMENT  2013    bovine with bypass x1per dr AVERY       Immunization History:  Influenza: No  Pneumococcal: No  Tetanus: No  Covid : No    Social History:   Tobacco:   Social History     Tobacco Use   Smoking Status Never Smoker   Smokeless Tobacco Never Used      Alcohol:     Social History     Substance and Sexual Activity   Alcohol Use Never         Physical Exam:/81 (BP Location: Right arm, Patient Position: Lying)   Pulse 75   Temp 96.3 °F (35.7 °C) (Temporal)   Resp 16   Ht 165.1 cm (65\")   Wt 70.3 kg (155 lb)   SpO2 100%   BMI 25.79 kg/m²       General Appearance:    Alert, cooperative, no distress, appears stated age   Head:    Normocephalic, without obvious abnormality, atraumatic   Lungs:     Clear to auscultation bilaterally, respirations unlabored    Heart:   Regular rate and rhythm, S1 and S2 normal    Abdomen:    Soft without tenderness   Extremities:   Extremities normal, atraumatic, no cyanosis or edema   Skin:   Skin color, texture, turgor normal, no rashes or lesions   Neurologic:   Grossly intact     Results Review:     LABS:  Lab Results   Component Value Date    WBC 6.49 05/18/2022    HGB 13.7 05/18/2022    HCT 42.5 05/18/2022    MCV 87.4 05/18/2022     05/18/2022    GLUCOSE 118 (H) 05/18/2022    BUN 16 05/18/2022    CREATININE 0.90 05/18/2022     05/18/2022    K 4.1 05/18/2022     05/18/2022    CO2 29.0 05/18/2022    CALCIUM " 9.8 05/18/2022       RADIOLOGY:  Imaging Results (Last 72 Hours)     ** No results found for the last 72 hours. **          I reviewed the patient's new clinical results.    Cancer Staging (if applicable)  Cancer Patient: __ yes __no __unknown; If yes, clinical stage T:__ N:__M:__, stage group or __N/A      Impression: single non-toxic thyroid nodule       Plan: LEFT THYROID LOBECTOMY      Julia Durham, CLEMENCIA   5/20/2022   08:23 EDT

## 2022-05-20 NOTE — BRIEF OP NOTE
THYROIDECTOMY  Progress Note    Crystal Mccloud  5/20/2022    Pre-op Diagnosis:   Left thyroid nodule       Post-Op Diagnosis Codes:     * Left thyroid nodule [E04.1]    Procedure/CPT® Codes:        Procedure(s):  LEFT THYROID LOBECTOMY    Surgeon(s):  Je Gutierrez MD    Anesthesia: General    Staff:   Circulator: Lilliam Richter RN; Rachael Mckinnon RN  Physician Assistant: Aguilar Galloway PA  Scrub Person: Ric Geiger  Nursing Assistant: Feli Tierney PCT         Estimated Blood Loss: minimal    Urine Voided: * No values recorded between 5/20/2022  9:34 AM and 5/20/2022 10:32 AM *    Specimens:                Specimens     ID Source Type Tests Collected By Collected At [facility]?    A Thyroid Tissue · TISSUE PATHOLOGY EXAM   Je Gutierrez MD 5/20/22 1027 No    Description: Left thyrolid lobe                Drains: * No LDAs found *    Findings: Left thyroid nodule completely removed grossly        Complications: none    SHAHRZAD Chandler was responsible for performing the following activities: Retraction, Suction, Suturing, Closing and Placing Dressing and their skilled assistance was necessary for the success of this case.      Je Gutierrez MD     Date: 5/20/2022  Time: 10:37 EDT

## 2022-05-20 NOTE — OP NOTE
General Surgery Operative Note    THYROIDECTOMY  Procedure Report    Patient Name:  Crystal Mccloud  YOB: 1945  [id number]     Date of Surgery:  5/20/2022       Pre-op Diagnosis: Left thyroid lobe nodule    Post-op Diagnosis: Left thyroid lobe nodule    Procedure(s):  LEFT THYROID LOBECTOMY    Surgeon: Je Gutierrez MD    Assistant: SHAHRZAD Chandler      Anesthesia: General    Estimated Blood Loss: minimal    Complications: None     Implants:    Implant Name Type Inv. Item Serial No.  Lot No. LRB No. Used Action   CLIP LIGAT VASC HORIZON TI MD ANALILIA 6CT - [id number] Implant CLIP LIGAT VASC HORIZON TI MD ANALILIA 6CT  [employer] [id number] Left 4 Implanted   CLIP LIGAT VASC HORIZON TI SM YEL 6CT - [id number] Implant CLIP LIGAT VASC HORIZON TI SM YEL 6CT  [employer] [id number] Left 4 Implanted   HEMOST ABS SURGICEL 4X8IN - [id number] Implant HEMOST ABS SURGICEL 4X8IN  [employer] J [employer] J [id number] Left 1 Implanted       Specimen:          Specimens     ID Source Type Tests Collected By Collected At Frozen?    A Thyroid Tissue · TISSUE PATHOLOGY EXAM   Je Gutierrez MD 5/20/22 1027 No    Description: Left thyrolid lobe              Findings: Left thyroid nodule completely removed grossly    Indications: The patient is a 76-year-old female with a recent diagnosis of a left thyroid nodule.  Fine-needle aspiration was performed on the nodule and was found to have atypia of undetermined significance.  Discussions were made with the patient about various treatment options and the patient was found to be agreeable to a left thyroid lobectomy.  Informed consent was obtained.    Description of Procedure:     After obtaining informed consent, the patient was taken to the operating room and placed in supine position. After appropriate DVT and antibiotic prophylaxis, general anesthesia was induced with a placement of an Nims tube for nerve monitoring. The neck was prepped  and draped in standard sterile fashion.    An approximately 6 cm incision was made 2 fingerbreadths superior to the suprasternal notch transversely across the midline neck.  The skin was incised using a 15 blade.  The dermis and subcutaneous tissue were divided using Bovie electrocautery.  The platysma was dissected out and divided using Bovie electrocautery.  Subplatysmal flaps were created superiorly to the thyroid cartilage, inferiorly to the sternal notch, and laterally to the sternocleidomastoid muscles.  The strap muscles were divided in the midline at the median raphae.  Attention was paid to the left thyroid lobe.  The strap muscles were dissected off the anterior and lateral surfaces of the thyroid lobe using Bovie electrocautery.  Babcocks were placed on the thyroid lobe and it was retracted anteriorly and medially.  Superior thyroid vessels were ligated with combination of clips and Harmonic.  The superior and inferior parathyroid glands were identified and dissected away from the thyroid lobe with blood supply left intact.  The recurrent laryngeal nerve was identified by both nerve stimulation and visualization.  Soft tissues around this area were dissected using bipolar electrocautery and small vessels ligated with clips.  Once the thyroid was dissected away from the area of the nerve the remainder the thyroid lobe was dissected off the anterior surface of the trachea using bipolar cautery.  Inferior thyroid vessels were ligated with combination of clips and Harmonic. The thyroid was then transected at the level of the isthmus using the Harmonic device. The specimen was then removed from the body and inspected for parathyroid tissue, and none was noted. It was then sent to pathology as a permanent specimen.      Valsalva maneuver was performed by anesthesia to assess for any further bleeding and any further bleeding was controlled using placement of clips.  After hemostasis was obtained the left  recurrent laryngeal nerve was grossly intact throughout its entire visualized course and stimulated appropriately with nerve stimulation.  Surgicel was placed in all areas of dissection.  The strap muscles were loosely reapproximated using interrupted 3-0 Vicryl.  The platysma was reapproximated using interrupted 3-0 Vicryl.  The skin was reapproximated using a running subcuticular 4-0 Monocryl.  A dry dressing was placed on top of this.  The patient awoke from anesthesia without complications and was taken to the PACU in stable condition.      SHAHRZAD Chandler was responsible for performing the following activities: Retraction, Suction, Suturing, Closing and Placing Dressing and their skilled assistance was necessary for the success of this case.          Je Gutierrez MD     Date: 5/20/2022  Time: 10:42 EDT

## 2022-05-20 NOTE — ANESTHESIA PROCEDURE NOTES
Airway  Urgency: elective    Date/Time: 5/20/2022 9:41 AM  Airway not difficult    General Information and Staff    Patient location during procedure: OR  CRNA/CAA: Michell Pickens CRNA    Indications and Patient Condition  Indications for airway management: airway protection    Preoxygenated: yes  MILS not maintained throughout  Mask difficulty assessment: 1 - vent by mask    Final Airway Details  Final airway type: endotracheal airway      Successful airway: ETT and NIM tube  Cuffed: yes   Successful intubation technique: direct laryngoscopy  Endotracheal tube insertion site: oral  Blade: Olena  Blade size: 3  ETT size (mm): 7.0  Cormack-Lehane Classification: grade I - full view of glottis  Placement verified by: chest auscultation and capnometry   Measured from: lips  ETT/EBT  to lips (cm): 19  Number of attempts at approach: 1  Assessment: lips, teeth, and gum same as pre-op and atraumatic intubation    Additional Comments  Fernandez used for surgeon to verify placement of NIM tube.  Symmetric chest rise and fall. +ETCO2 +BBS.

## 2022-05-20 NOTE — ANESTHESIA POSTPROCEDURE EVALUATION
Patient: Crystal Mccloud    Procedure Summary     Date: 05/20/22 Room / Location:  [facility] /  [facility]    Anesthesia Start: 0934 Anesthesia Stop: 1051    Procedure: LEFT THYROID LOBECTOMY (Left Neck) Diagnosis: Left thyroid nodule    Surgeons: Je Gutierrez MD Provider: Aubrey Kessler MD    Anesthesia Type: general ASA Status: 2          Anesthesia Type: general    Vitals  Vitals Value Taken Time   /64 05/20/22 1051   Temp 98.1 °F (36.7 °C) 05/20/22 1051   Pulse 61 05/20/22 1051   Resp 16 05/20/22 1051   SpO2 92 % 05/20/22 1051           Post Anesthesia Care and Evaluation    Patient location during evaluation: PACU  Patient participation: waiting for patient participation  Level of consciousness: sleepy but conscious  Pain management: adequate  Airway patency: patent  Anesthetic complications: No anesthetic complications  PONV Status: none  Cardiovascular status: hemodynamically stable and acceptable  Respiratory status: nonlabored ventilation, acceptable, nasal cannula and oral airway  Hydration status: acceptable

## 2022-05-20 NOTE — PLAN OF CARE
Goal Outcome Evaluation:         Walked from the stretcher to bed.      Pt tolerating PO medication and clear liquid diet.     Up X1 assist to the bathroom.     Dressing C/D/I    PRN given for pain.

## 2022-05-21 VITALS
WEIGHT: 155 LBS | HEIGHT: 65 IN | OXYGEN SATURATION: 93 % | RESPIRATION RATE: 17 BRPM | SYSTOLIC BLOOD PRESSURE: 128 MMHG | TEMPERATURE: 97.7 F | HEART RATE: 79 BPM | DIASTOLIC BLOOD PRESSURE: 62 MMHG | BODY MASS INDEX: 25.83 KG/M2

## 2022-05-21 PROCEDURE — A9270 NON-COVERED ITEM OR SERVICE: HCPCS | Performed by: SURGERY

## 2022-05-21 PROCEDURE — 63710000001 OXYCODONE-ACETAMINOPHEN 5-325 MG TABLET: Performed by: SURGERY

## 2022-05-21 PROCEDURE — 63710000001 METOPROLOL SUCCINATE XL 50 MG TABLET SUSTAINED-RELEASE 24 HOUR: Performed by: SURGERY

## 2022-05-21 PROCEDURE — 63710000001 AMLODIPINE 5 MG TABLET: Performed by: SURGERY

## 2022-05-21 PROCEDURE — 63710000001 ASPIRIN 81 MG TABLET DELAYED-RELEASE: Performed by: SURGERY

## 2022-05-21 PROCEDURE — 63710000001 FAMOTIDINE 20 MG TABLET: Performed by: SURGERY

## 2022-05-21 PROCEDURE — G0378 HOSPITAL OBSERVATION PER HR: HCPCS

## 2022-05-21 PROCEDURE — 63710000001 LEVOTHYROXINE 50 MCG TABLET: Performed by: SURGERY

## 2022-05-21 RX ORDER — OXYCODONE HYDROCHLORIDE AND ACETAMINOPHEN 5; 325 MG/1; MG/1
1 TABLET ORAL EVERY 4 HOURS PRN
Qty: 10 TABLET | Refills: 0 | Status: SHIPPED | OUTPATIENT
Start: 2022-05-21 | End: 2022-05-30

## 2022-05-21 RX ORDER — LEVOTHYROXINE SODIUM 0.05 MG/1
50 TABLET ORAL
Qty: 30 TABLET | Refills: 2 | Status: SHIPPED | OUTPATIENT
Start: 2022-05-22 | End: 2022-08-20

## 2022-05-21 RX ORDER — PSEUDOEPHEDRINE HCL 30 MG
100 TABLET ORAL 2 TIMES DAILY PRN
Qty: 30 CAPSULE | Refills: 0 | Status: SHIPPED | OUTPATIENT
Start: 2022-05-21

## 2022-05-21 RX ADMIN — LEVOTHYROXINE SODIUM 50 MCG: 50 TABLET ORAL at 05:51

## 2022-05-21 RX ADMIN — METOPROLOL SUCCINATE 50 MG: 50 TABLET, EXTENDED RELEASE ORAL at 08:24

## 2022-05-21 RX ADMIN — AMLODIPINE BESYLATE 5 MG: 5 TABLET ORAL at 08:23

## 2022-05-21 RX ADMIN — FAMOTIDINE 20 MG: 20 TABLET ORAL at 08:23

## 2022-05-21 RX ADMIN — ASPIRIN 81 MG: 81 TABLET, COATED ORAL at 08:24

## 2022-05-21 RX ADMIN — OXYCODONE HYDROCHLORIDE AND ACETAMINOPHEN 1 TABLET: 5; 325 TABLET ORAL at 08:24

## 2022-05-21 NOTE — PROGRESS NOTES
"Patient Name:  Crystal Mccloud  YOB: 1945  [id number]    Surgery Progress Note    Date of visit: 5/21/2022    Subjective   Pain well controlled and minimal. No nausea/vomiting. No fevers/chills.          Objective       /62 (BP Location: Right arm, Patient Position: Lying)   Pulse 79   Temp 97.7 °F (36.5 °C) (Oral)   Resp 17   Ht 165.1 cm (65\")   Wt 70.3 kg (155 lb)   SpO2 93%   BMI 25.79 kg/m²     Intake/Output Summary (Last 24 hours) at 5/21/2022 0911  Last data filed at 5/21/2022 0339  Gross per 24 hour   Intake 1600 ml   Output 760 ml   Net 840 ml       CV:  Rhythm regular and rate regular  L:  Clear to auscultation bilaterally  Abd:  Bowel sounds positive, soft  Ext:  No cyanosis, clubbing, edema  HEENT: neck flat, dressing c/d/i         Assessment & Plan     Pt POD#1 left thyroid lobectomy  Pain control  OOB, IS, DVT prlx  Regular diet  Synthroid  DC planning      Je Gutierrez MD  5/21/2022  09:11 EDT      "

## 2022-05-21 NOTE — PLAN OF CARE
Goal Outcome Evaluation:      Pt pleasant and cooperative, rested well overnight. Pain meds given as ordered per pt req. Dsg in place to neck, CDI. Plan to d/c home today. VSS on Ra, will cont to [facility].

## 2022-05-23 LAB
CYTO UR: NORMAL
LAB AP CASE REPORT: NORMAL
LAB AP CLINICAL INFORMATION: NORMAL
PATH REPORT.FINAL DX SPEC: NORMAL
PATH REPORT.GROSS SPEC: NORMAL

## 2022-05-23 NOTE — DISCHARGE SUMMARY
Patient Name:  Crystal Mccloud  YOB: 1945  [id number]      Date of Discharge:  5/21/2022    Discharge Diagnosis:   Thyroid nodule    Problem List:  Active Hospital Problems    Diagnosis  POA   • Thyroid nodule [E04.1]  Yes      Resolved Hospital Problems   No resolved problems to display.       Presenting Problem/History of Present Illness  Thyroid nodule [E04.1]      Hospital Course  Patient is a 76 y.o. female presented with a left thyroid nodule. On postoperative day one, she was tolerating a diet, pain was well-controlled. No nausea/vomiting. No fevers/chills. No wound issues. She was voiding spontaneously and ambulating appropriately. She was thus found to be safe for discharge to home.       Procedures Performed    Procedure(s):  THYROID LOBECTOMY LEFT  -------------------       Consults:   Consults     No orders found from 4/21/2022 to 5/21/2022.          Pertinent Test Results: N/A    Condition on Discharge:  Pain controlled with oral pain medication, tolerating diet, ambulating appropriately      Vital Signs       Discharge Disposition  Home or Self Care    Discharge Medications     Discharge Medications      New Medications      Instructions Start Date   levothyroxine 50 MCG tablet  Commonly known as: SYNTHROID, LEVOTHROID   50 mcg, Oral, Every Early Morning      oxyCODONE-acetaminophen 5-325 MG per tablet  Commonly known as: PERCOCET   1 tablet, Oral, Every 4 Hours PRN      Stool Softener 100 MG capsule  Generic drug: docusate sodium   100 mg, Oral, 2 Times Daily PRN         Continue These Medications      Instructions Start Date   amLODIPine 5 MG tablet  Commonly known as: NORVASC   5 mg, Oral, Daily      aspirin 81 MG EC tablet   81 mg, Oral, Daily      hydrocortisone 2.5 % cream   1 application, Topical, Daily, Daily to face for roscea      metoprolol succinate XL 50 MG 24 hr tablet  Commonly known as: TOPROL-XL   50 mg, Oral, Daily      metroNIDAZOLE 0.75 % gel  Commonly known as:  METROGEL   1 application, Topical, Nightly, Apply to nose      nortriptyline 25 MG capsule  Commonly known as: PAMELOR   25 mg, Oral, Nightly      omeprazole 20 MG capsule  Commonly known as: priLOSEC   20 mg, Oral, Daily PRN      prednisoLONE acetate 1 % ophthalmic suspension  Commonly known as: PRED FORTE   1 drop, Both Eyes, Daily      rosuvastatin 20 MG tablet  Commonly known as: CRESTOR   20 mg, Oral, Daily             Discharge Diet       Activity at Discharge  Activity Instructions     Discharge Activity      1) No driving until no longer taking narcotics.   2) Return to school / work in 1 week.  3) May shower starting 5/22. No tub baths. No swimming.   4) Do not lift / push / pull more than 15 lbs.          Follow-up Appointments  No future appointments.  Additional Instructions for the Follow-ups that You Need to Schedule     Discharge Follow-up with Specified Provider: Dr. Gutierrez's office; 2 Weeks   As directed      To: Dr. Gutierrez's office    Follow Up: 2 Weeks    Follow Up Details: Call [phone] to make an appointment         Notify Physician or Go To The ED For the Following Conditions   As directed      Fever >100.4, increased pain, rapid neck swelling, new redness/drainage from incision    Order Comments: Fever >100.4, increased pain, rapid neck swelling, new redness/drainage from incision                Test Results Pending at Discharge      Time: Discharge 15 min    Je Gutierrez MD   05/23/22   13:46 EDT

## 2022-06-09 ENCOUNTER — LAB (OUTPATIENT)
Dept: LAB | Facility: HOSPITAL | Age: 77
End: 2022-06-09

## 2022-06-09 ENCOUNTER — TRANSCRIBE ORDERS (OUTPATIENT)
Dept: LAB | Facility: HOSPITAL | Age: 77
End: 2022-06-09

## 2022-06-09 DIAGNOSIS — E04.1 NONTOXIC UNINODULAR GOITER: Primary | ICD-10-CM

## 2022-06-09 DIAGNOSIS — E04.1 NONTOXIC UNINODULAR GOITER: ICD-10-CM

## 2022-06-09 LAB — TSH SERPL DL<=0.05 MIU/L-ACNC: 1.75 UIU/ML (ref 0.27–4.2)

## 2022-06-09 PROCEDURE — 84443 ASSAY THYROID STIM HORMONE: CPT

## (undated) DEVICE — GAUZE,SPONGE,4"X4",16PLY,XRAY,STRL,LF: Brand: MEDLINE

## (undated) DEVICE — ANTIBACTERIAL UNDYED BRAIDED (POLYGLACTIN 910), SYNTHETIC ABSORBABLE SUTURE: Brand: COATED VICRYL

## (undated) DEVICE — MARKER,SKIN,W/RULER,DUAL,STOP: Brand: MEDLINE

## (undated) DEVICE — INTENDED USE FOR SURGICAL MARKING ON INTACT SKIN, ALSO PROVIDES A PERMANENT METHOD OF IDENTIFYING OBJECTS IN THE OPERATING ROOM: Brand: WRITESITE® REGULAR TIP SKIN MARKER

## (undated) DEVICE — DRSNG SURESITE WNDW 4X4.5

## (undated) DEVICE — DISPOSABLE BIPOLAR FORCEPS 4" (10.2CM) JEWELERS, STRAIGHT 0.4MM TIP AND 12 FT. (3.6M) CABLE: Brand: KIRWAN

## (undated) DEVICE — ELECTRODE 8227410 PAIRED 2 CH SET ROHS

## (undated) DEVICE — PROBE 8225825 3PK INCREMT STD PRASS ROHS

## (undated) DEVICE — PATIENT RETURN ELECTRODE, SINGLE-USE, CONTACT QUALITY MONITORING, ADULT, WITH 9FT CORD, FOR PATIENTS WEIGING OVER 33LBS. (15KG): Brand: MEGADYNE

## (undated) DEVICE — PK MINOR SPLT 10

## (undated) DEVICE — UNDERGLV SURG BIOGEL INDICATOR LF PF 7.5

## (undated) DEVICE — GLV SURG BIOGEL LTX PF 7

## (undated) DEVICE — TBG PENCL TELESCP MEGADYNE SMOKE EVAC 10FT

## (undated) DEVICE — APPL CHLORAPREP 26ML CLR

## (undated) DEVICE — TRAP FLD MINIVAC MEGADYNE 100ML

## (undated) DEVICE — SUT MNCRYL PLS ANTIB UD 4/0 PS2 18IN

## (undated) DEVICE — ELECTRD BLD EZ CLN MOD XLNG 2.75IN

## (undated) DEVICE — HDRST PAD EA/20PC

## (undated) DEVICE — HARMONIC FOCUS SHEARS 9CM LENGTH + ADAPTIVE TISSUE TECHNOLOGY FOR USE WITH BLUE HAND PIECE ONLY: Brand: HARMONIC FOCUS

## (undated) DEVICE — Device